# Patient Record
Sex: MALE | Race: WHITE | NOT HISPANIC OR LATINO | Employment: FULL TIME | URBAN - METROPOLITAN AREA
[De-identification: names, ages, dates, MRNs, and addresses within clinical notes are randomized per-mention and may not be internally consistent; named-entity substitution may affect disease eponyms.]

---

## 2023-01-25 RX ORDER — LORATADINE 10 MG/1
TABLET ORAL
COMMUNITY
Start: 2022-08-08

## 2023-03-08 ASSESSMENT — PROMIS GLOBAL HEALTH SCALE
RATE_QUALITY_OF_LIFE: VERY GOOD
RATE_MENTAL_HEALTH: VERY GOOD
RATE_PHYSICAL_HEALTH: VERY GOOD
RATE_GENERAL_HEALTH: VERY GOOD
RATE_AVERAGE_PAIN: 1
CARRYOUT_SOCIAL_ACTIVITIES: VERY GOOD
RATE_SOCIAL_SATISFACTION: VERY GOOD
EMOTIONAL_PROBLEMS: RARELY
CARRYOUT_PHYSICAL_ACTIVITIES: COMPLETELY
RATE_AVERAGE_FATIGUE: MILD

## 2023-03-09 ENCOUNTER — OFFICE VISIT (OUTPATIENT)
Dept: PRIMARY CARE | Facility: CLINIC | Age: 33
End: 2023-03-09
Payer: COMMERCIAL

## 2023-03-09 VITALS
TEMPERATURE: 97.6 F | HEIGHT: 67 IN | HEART RATE: 73 BPM | WEIGHT: 184.6 LBS | DIASTOLIC BLOOD PRESSURE: 60 MMHG | BODY MASS INDEX: 28.97 KG/M2 | OXYGEN SATURATION: 97 % | SYSTOLIC BLOOD PRESSURE: 120 MMHG

## 2023-03-09 DIAGNOSIS — Z00.00 HEALTH MAINTENANCE EXAMINATION: Primary | ICD-10-CM

## 2023-03-09 PROCEDURE — 1036F TOBACCO NON-USER: CPT | Performed by: STUDENT IN AN ORGANIZED HEALTH CARE EDUCATION/TRAINING PROGRAM

## 2023-03-09 PROCEDURE — 99395 PREV VISIT EST AGE 18-39: CPT | Performed by: STUDENT IN AN ORGANIZED HEALTH CARE EDUCATION/TRAINING PROGRAM

## 2023-03-09 ASSESSMENT — PATIENT HEALTH QUESTIONNAIRE - PHQ9
SUM OF ALL RESPONSES TO PHQ9 QUESTIONS 1 AND 2: 0
1. LITTLE INTEREST OR PLEASURE IN DOING THINGS: NOT AT ALL
2. FEELING DOWN, DEPRESSED OR HOPELESS: NOT AT ALL

## 2023-03-09 ASSESSMENT — ENCOUNTER SYMPTOMS
CONSTIPATION: 0
HEMATURIA: 0
CHILLS: 0
FATIGUE: 0
COUGH: 0
FREQUENCY: 0
PALPITATIONS: 0
DIARRHEA: 0
HEADACHES: 0
DYSPHORIC MOOD: 0
NAUSEA: 0
NERVOUS/ANXIOUS: 0
ABDOMINAL PAIN: 0
DYSURIA: 0
NUMBNESS: 0
FEVER: 0
SHORTNESS OF BREATH: 0
WHEEZING: 0
DIZZINESS: 0

## 2023-03-09 NOTE — PROGRESS NOTES
"Subjective   Patient ID: John Corona is a 32 y.o. male who presents for Annual Exam (CPE).    HPI     Adult Male physical     Patient Name: John Corona    Date: 23           Patient : 1990        Patient Age: 32 y.o.     CC:   Chief Complaint   Patient presents with    Annual Exam     CPE        SUBJECTIVE    Concerns: none      Screening:  Colon Cancer screening? N/a  Smoking hx? Not really      Immunizations: UTD    Social:  The patient is exercising regularly, 20+ mpw, not training for anything. Some core work.  in general, a \"healthy\" diet  , well balanced   Hx depression or anxiety: Depression last on meds   history of alcohol abuse none for the last 10 years   Had a substance abuse problem until 19 yo, has been clean for over 10 years. Was rx opiates      No past medical history on file.   Past Surgical History:   Procedure Laterality Date    OTHER SURGICAL HISTORY  2022    Hydrocele repair    OTHER SURGICAL HISTORY  2022    Appendectomy      Family History   Problem Relation Name Age of Onset    Asthma Mother      Hyperlipidemia Mother      Mental illness Mother      Hypertension Mother      Hyperlipidemia Father      Hypertension Father      Hyperlipidemia Maternal Grandmother      Alcohol abuse Maternal Grandfather      Hyperlipidemia Maternal Grandfather      Alcohol abuse Paternal Grandfather      Mental illness Other Sibling      Current Outpatient Medications   Medication Sig Dispense Refill    loratadine (Claritin) 10 mg tablet Take by mouth.       No current facility-administered medications for this visit.       No Known Allergies    Immunization History   Administered Date(s) Administered    Pfizer Purple Cap SARS-CoV-2 2021, 2021    Pfizer Sars-cov-2 Bivalent 30 mcg/0.3 mL 2022        Health Maintenance   Topic Date Due    Yearly Adult Physical  Never done    Lipid Panel  Never done    HIV Screening  Never done    Varicella Vaccines (1 of 2 - 2-dose " "childhood series) Never done    Hepatitis C Screening  Never done    DTaP/Tdap/Td Vaccines (2 - Td or Tdap) 02/23/2018    Zoster Vaccines (1 of 2) 07/08/2038    Hepatitis B Vaccines  Completed    MMR Vaccines  Completed    Influenza Vaccine  Completed    COVID-19 Vaccine  Completed    HIB Vaccines  Aged Out    IPV Vaccines  Aged Out    Hepatitis A Vaccines  Aged Out    Meningococcal Vaccine  Aged Out    Rotavirus Vaccines  Aged Out    HPV Vaccines  Aged Out    Pneumococcal Vaccine: Pediatrics (0 to 5 Years) and At-Risk Patients (6 to 64 Years)  Aged Out                Review of Systems   Constitutional:  Negative for chills, fatigue and fever.   Respiratory:  Negative for cough, shortness of breath and wheezing.    Cardiovascular:  Negative for chest pain, palpitations and leg swelling.   Gastrointestinal:  Negative for abdominal pain, constipation, diarrhea and nausea.   Genitourinary:  Negative for dysuria, frequency, hematuria and urgency.   Neurological:  Negative for dizziness, numbness and headaches.   Psychiatric/Behavioral:  Negative for dysphoric mood. The patient is not nervous/anxious.        Objective   /60 (BP Location: Left arm, Patient Position: Sitting, BP Cuff Size: Adult)   Pulse 73   Temp 36.4 °C (97.6 °F) (Temporal)   Ht 1.689 m (5' 6.5\")   Wt 83.7 kg (184 lb 9.6 oz)   SpO2 97%   BMI 29.35 kg/m²     Physical Exam  Constitutional:       General: He is not in acute distress.     Appearance: Normal appearance.   HENT:      Head: Normocephalic and atraumatic.      Right Ear: Tympanic membrane and ear canal normal.      Left Ear: Tympanic membrane and ear canal normal.      Mouth/Throat:      Mouth: Mucous membranes are moist.      Pharynx: No posterior oropharyngeal erythema.   Eyes:      Extraocular Movements: Extraocular movements intact.      Pupils: Pupils are equal, round, and reactive to light.   Cardiovascular:      Rate and Rhythm: Normal rate and regular rhythm.      Heart " sounds: No murmur heard.  Pulmonary:      Effort: Pulmonary effort is normal. No respiratory distress.      Breath sounds: Normal breath sounds. No wheezing.   Abdominal:      General: Bowel sounds are normal.      Palpations: Abdomen is soft.      Tenderness: There is no abdominal tenderness. There is no guarding.   Musculoskeletal:      Cervical back: Neck supple.      Comments: Normal gait and range of motion   Skin:     General: Skin is warm and dry.   Neurological:      General: No focal deficit present.      Mental Status: He is alert and oriented to person, place, and time.   Psychiatric:         Mood and Affect: Mood normal.         Behavior: Behavior normal.         Assessment/Plan   Problem List Items Addressed This Visit    None  Visit Diagnoses       Health maintenance examination    -  Primary    Relevant Orders    Lipid Panel    Comprehensive Metabolic Panel    CBC    Follow Up In Primary Care          We will obtain fasting blood work.  Results will be communicated to the patient via MyChart or a letter.   We reviewed appropriate preventative health screening guidelines. The patient is up-to-date on Immunizations.  We discussed regular aerobic exercise. We discussed proper nutrition and weight control.     Orders Placed This Encounter   Procedures    Lipid Panel     Standing Status:   Future     Standing Expiration Date:   3/9/2024     Order Specific Question:   Release to patient     Answer:   Immediate    Comprehensive Metabolic Panel     Standing Status:   Future     Standing Expiration Date:   3/9/2024     Order Specific Question:   Release to patient     Answer:   Immediate    CBC     Standing Status:   Future     Standing Expiration Date:   3/9/2024     Order Specific Question:   Release to patient     Answer:   Immediate        No follow-ups on file.       Patient understands and agrees with treatment plan    Ivone Zee DO   03/09/23

## 2023-03-09 NOTE — PATIENT INSTRUCTIONS
Recommendations for men annual wellness exam:   Colonoscopy at age 45 or earlier if positive family history of colon cancer   Discuss prostate cancer screening between ages 55-69 or sooner if family history of prostate cancer   One time screen for abdominal aortic aneurysm for men ages 65-75 who have ever smoked  Screening for lung cancer with low-dose CT in all adults age 55-77 who have a 30 pack-year smoking history and currently smoke or have quit within the past 15 years  Follow a healthy diet (Dash diet, Mediterranean diet)  Exercise 150 min/wk  Maintain healthy weight (BMI < 25)  Do not smoke   Alcohol in moderation (up to 2 drinks/day)   Get enough sleep (7-8 hours/night)  Make sure immunizations are up to date (influenza, pneumococcal, Tdap, shingles if age > 50)  Visit dentist twice yearly

## 2023-03-23 ENCOUNTER — LAB (OUTPATIENT)
Dept: LAB | Facility: LAB | Age: 33
End: 2023-03-23
Payer: COMMERCIAL

## 2023-03-23 DIAGNOSIS — Z00.00 HEALTH MAINTENANCE EXAMINATION: ICD-10-CM

## 2023-03-23 LAB
ALANINE AMINOTRANSFERASE (SGPT) (U/L) IN SER/PLAS: 18 U/L (ref 10–52)
ALBUMIN (G/DL) IN SER/PLAS: 4.8 G/DL (ref 3.4–5)
ALKALINE PHOSPHATASE (U/L) IN SER/PLAS: 58 U/L (ref 33–120)
ANION GAP IN SER/PLAS: 12 MMOL/L (ref 10–20)
ASPARTATE AMINOTRANSFERASE (SGOT) (U/L) IN SER/PLAS: 17 U/L (ref 9–39)
BILIRUBIN TOTAL (MG/DL) IN SER/PLAS: 1.1 MG/DL (ref 0–1.2)
CALCIUM (MG/DL) IN SER/PLAS: 9.6 MG/DL (ref 8.6–10.3)
CARBON DIOXIDE, TOTAL (MMOL/L) IN SER/PLAS: 29 MMOL/L (ref 21–32)
CHLORIDE (MMOL/L) IN SER/PLAS: 101 MMOL/L (ref 98–107)
CHOLESTEROL (MG/DL) IN SER/PLAS: 226 MG/DL (ref 0–199)
CHOLESTEROL IN HDL (MG/DL) IN SER/PLAS: 60.2 MG/DL
CHOLESTEROL/HDL RATIO: 3.8
CREATININE (MG/DL) IN SER/PLAS: 1.01 MG/DL (ref 0.5–1.3)
ERYTHROCYTE DISTRIBUTION WIDTH (RATIO) BY AUTOMATED COUNT: 13.3 % (ref 11.5–14.5)
ERYTHROCYTE MEAN CORPUSCULAR HEMOGLOBIN CONCENTRATION (G/DL) BY AUTOMATED: 31.4 G/DL (ref 32–36)
ERYTHROCYTE MEAN CORPUSCULAR VOLUME (FL) BY AUTOMATED COUNT: 83 FL (ref 80–100)
ERYTHROCYTES (10*6/UL) IN BLOOD BY AUTOMATED COUNT: 5.92 X10E12/L (ref 4.5–5.9)
GFR MALE: >90 ML/MIN/1.73M2
GLUCOSE (MG/DL) IN SER/PLAS: 89 MG/DL (ref 74–99)
HEMATOCRIT (%) IN BLOOD BY AUTOMATED COUNT: 49 % (ref 41–52)
HEMOGLOBIN (G/DL) IN BLOOD: 15.4 G/DL (ref 13.5–17.5)
LDL: 152 MG/DL (ref 0–99)
LEUKOCYTES (10*3/UL) IN BLOOD BY AUTOMATED COUNT: 6.1 X10E9/L (ref 4.4–11.3)
PLATELETS (10*3/UL) IN BLOOD AUTOMATED COUNT: 321 X10E9/L (ref 150–450)
POTASSIUM (MMOL/L) IN SER/PLAS: 4.4 MMOL/L (ref 3.5–5.3)
PROTEIN TOTAL: 7.1 G/DL (ref 6.4–8.2)
SODIUM (MMOL/L) IN SER/PLAS: 138 MMOL/L (ref 136–145)
TRIGLYCERIDE (MG/DL) IN SER/PLAS: 68 MG/DL (ref 0–149)
UREA NITROGEN (MG/DL) IN SER/PLAS: 19 MG/DL (ref 6–23)
VLDL: 14 MG/DL (ref 0–40)

## 2023-03-23 PROCEDURE — 36415 COLL VENOUS BLD VENIPUNCTURE: CPT

## 2023-03-23 PROCEDURE — 80061 LIPID PANEL: CPT

## 2023-03-23 PROCEDURE — 80053 COMPREHEN METABOLIC PANEL: CPT

## 2023-03-23 PROCEDURE — 85027 COMPLETE CBC AUTOMATED: CPT

## 2024-06-25 ASSESSMENT — PROMIS GLOBAL HEALTH SCALE
RATE_AVERAGE_PAIN: 0
CARRYOUT_SOCIAL_ACTIVITIES: GOOD
RATE_SOCIAL_SATISFACTION: VERY GOOD
RATE_PHYSICAL_HEALTH: VERY GOOD
EMOTIONAL_PROBLEMS: ALWAYS
RATE_MENTAL_HEALTH: FAIR
RATE_GENERAL_HEALTH: GOOD
RATE_AVERAGE_FATIGUE: MODERATE
RATE_QUALITY_OF_LIFE: GOOD
CARRYOUT_PHYSICAL_ACTIVITIES: COMPLETELY

## 2024-06-27 ENCOUNTER — APPOINTMENT (OUTPATIENT)
Dept: PRIMARY CARE | Facility: CLINIC | Age: 34
End: 2024-06-27
Payer: COMMERCIAL

## 2024-09-29 ASSESSMENT — PROMIS GLOBAL HEALTH SCALE
CARRYOUT_SOCIAL_ACTIVITIES: GOOD
RATE_AVERAGE_FATIGUE: MODERATE
RATE_SOCIAL_SATISFACTION: VERY GOOD
CARRYOUT_PHYSICAL_ACTIVITIES: COMPLETELY
RATE_AVERAGE_PAIN: 0
RATE_QUALITY_OF_LIFE: GOOD
EMOTIONAL_PROBLEMS: OFTEN
RATE_GENERAL_HEALTH: GOOD
RATE_PHYSICAL_HEALTH: VERY GOOD
RATE_MENTAL_HEALTH: FAIR

## 2024-10-01 ENCOUNTER — APPOINTMENT (OUTPATIENT)
Dept: PRIMARY CARE | Facility: CLINIC | Age: 34
End: 2024-10-01
Payer: COMMERCIAL

## 2024-10-01 ENCOUNTER — LAB (OUTPATIENT)
Dept: LAB | Facility: LAB | Age: 34
End: 2024-10-01
Payer: COMMERCIAL

## 2024-10-01 VITALS
OXYGEN SATURATION: 97 % | SYSTOLIC BLOOD PRESSURE: 124 MMHG | DIASTOLIC BLOOD PRESSURE: 84 MMHG | WEIGHT: 198.6 LBS | BODY MASS INDEX: 31.17 KG/M2 | TEMPERATURE: 98.4 F | HEIGHT: 67 IN | HEART RATE: 70 BPM

## 2024-10-01 DIAGNOSIS — E55.9 VITAMIN D DEFICIENCY: ICD-10-CM

## 2024-10-01 DIAGNOSIS — Z23 NEED FOR IMMUNIZATION AGAINST INFLUENZA: ICD-10-CM

## 2024-10-01 DIAGNOSIS — Z00.00 HEALTH MAINTENANCE EXAMINATION: ICD-10-CM

## 2024-10-01 DIAGNOSIS — Z00.00 HEALTH MAINTENANCE EXAMINATION: Primary | ICD-10-CM

## 2024-10-01 LAB
25(OH)D3 SERPL-MCNC: 52 NG/ML (ref 30–100)
ANION GAP SERPL CALC-SCNC: 12 MMOL/L (ref 10–20)
BUN SERPL-MCNC: 17 MG/DL (ref 6–23)
CALCIUM SERPL-MCNC: 9.2 MG/DL (ref 8.6–10.3)
CHLORIDE SERPL-SCNC: 104 MMOL/L (ref 98–107)
CHOLEST SERPL-MCNC: 241 MG/DL (ref 0–199)
CHOLESTEROL/HDL RATIO: 3.5
CO2 SERPL-SCNC: 28 MMOL/L (ref 21–32)
CREAT SERPL-MCNC: 0.98 MG/DL (ref 0.5–1.3)
EGFRCR SERPLBLD CKD-EPI 2021: >90 ML/MIN/1.73M*2
ERYTHROCYTE [DISTWIDTH] IN BLOOD BY AUTOMATED COUNT: 13.8 % (ref 11.5–14.5)
GLUCOSE SERPL-MCNC: 84 MG/DL (ref 74–99)
HCT VFR BLD AUTO: 49.8 % (ref 41–52)
HDLC SERPL-MCNC: 68.2 MG/DL
HGB BLD-MCNC: 16 G/DL (ref 13.5–17.5)
LDLC SERPL CALC-MCNC: 160 MG/DL
MCH RBC QN AUTO: 26.5 PG (ref 26–34)
MCHC RBC AUTO-ENTMCNC: 32.1 G/DL (ref 32–36)
MCV RBC AUTO: 83 FL (ref 80–100)
NON HDL CHOLESTEROL: 173 MG/DL (ref 0–149)
NRBC BLD-RTO: 0 /100 WBCS (ref 0–0)
PLATELET # BLD AUTO: 299 X10*3/UL (ref 150–450)
POTASSIUM SERPL-SCNC: 4.7 MMOL/L (ref 3.5–5.3)
RBC # BLD AUTO: 6.04 X10*6/UL (ref 4.5–5.9)
SODIUM SERPL-SCNC: 139 MMOL/L (ref 136–145)
TRIGL SERPL-MCNC: 65 MG/DL (ref 0–149)
VLDL: 13 MG/DL (ref 0–40)
WBC # BLD AUTO: 6.7 X10*3/UL (ref 4.4–11.3)

## 2024-10-01 PROCEDURE — 99395 PREV VISIT EST AGE 18-39: CPT | Performed by: STUDENT IN AN ORGANIZED HEALTH CARE EDUCATION/TRAINING PROGRAM

## 2024-10-01 PROCEDURE — 36415 COLL VENOUS BLD VENIPUNCTURE: CPT

## 2024-10-01 PROCEDURE — 90471 IMMUNIZATION ADMIN: CPT | Performed by: STUDENT IN AN ORGANIZED HEALTH CARE EDUCATION/TRAINING PROGRAM

## 2024-10-01 PROCEDURE — 3008F BODY MASS INDEX DOCD: CPT | Performed by: STUDENT IN AN ORGANIZED HEALTH CARE EDUCATION/TRAINING PROGRAM

## 2024-10-01 PROCEDURE — 85027 COMPLETE CBC AUTOMATED: CPT

## 2024-10-01 PROCEDURE — 80061 LIPID PANEL: CPT

## 2024-10-01 PROCEDURE — 82306 VITAMIN D 25 HYDROXY: CPT

## 2024-10-01 PROCEDURE — 90656 IIV3 VACC NO PRSV 0.5 ML IM: CPT | Performed by: STUDENT IN AN ORGANIZED HEALTH CARE EDUCATION/TRAINING PROGRAM

## 2024-10-01 PROCEDURE — 80048 BASIC METABOLIC PNL TOTAL CA: CPT

## 2024-10-01 RX ORDER — CETIRIZINE HYDROCHLORIDE 10 MG/1
0.5 TABLET ORAL DAILY
COMMUNITY

## 2024-10-01 ASSESSMENT — ENCOUNTER SYMPTOMS
OCCASIONAL FEELINGS OF UNSTEADINESS: 0
DEPRESSION: 0

## 2024-10-01 ASSESSMENT — PATIENT HEALTH QUESTIONNAIRE - PHQ9
3. TROUBLE FALLING OR STAYING ASLEEP OR SLEEPING TOO MUCH: SEVERAL DAYS
2. FEELING DOWN, DEPRESSED OR HOPELESS: SEVERAL DAYS
1. LITTLE INTEREST OR PLEASURE IN DOING THINGS: NOT AT ALL
4. FEELING TIRED OR HAVING LITTLE ENERGY: SEVERAL DAYS
SUM OF ALL RESPONSES TO PHQ9 QUESTIONS 1 AND 2: 1

## 2024-10-01 NOTE — PROGRESS NOTES
"John Corona  presents for his annual wellness exam.    Specialists seen by patient: eye doctor  -dentist    Hx of colon ca screening: N/A  Immunizations: flu today, tdap UTD, will think about covid  Diet: Follows a healthy diet, well rounded  Exercise: Gets regular exercise, running 22.5 miles per week   History of anxiety or depression: Situational  Alcohol abuse screen:   Number of alcoholic drinks per week:  0, sober for 12 years   How many times in the past year 5 or more drinks in a day? 0  Lung cancer screening: n/a  Smoking history: never a smoker  AAA US: not applicable  Drug use: No  Depression screen yearly (phq-2): 1  Living will/healthcare power of : Yes - reviewed    Gets several sinus infections per year. Does a flonase, sinus rinse and zyrtec. For the past 3-4 years it has been happening. He feels that he breaths more out of one nostril.     Review of Systems   Constitutional:  Negative for chills, fatigue and fever.   Respiratory:  Negative for cough, shortness of breath and wheezing.    Cardiovascular:  Negative for chest pain, palpitations and leg swelling.   Gastrointestinal:  Negative for abdominal pain, constipation, diarrhea and nausea.   Genitourinary:  Negative for dysuria, frequency, hematuria and urgency.   Neurological:  Negative for dizziness, numbness and headaches.   Psychiatric/Behavioral:  Negative for dysphoric mood. The patient is not nervous/anxious.           Objective    /84 (BP Location: Left arm, Patient Position: Sitting, BP Cuff Size: Large adult)   Pulse 70   Temp 36.9 °C (98.4 °F) (Temporal)   Ht 1.695 m (5' 6.73\")   Wt 90.1 kg (198 lb 9.6 oz)   SpO2 97%   BMI 31.36 kg/m²     Physical Exam  Constitutional:       General: He is not in acute distress.     Appearance: Normal appearance.   HENT:      Head: Normocephalic and atraumatic.      Right Ear: Tympanic membrane and ear canal normal.      Left Ear: Tympanic membrane and ear canal normal.      " Mouth/Throat:      Mouth: Mucous membranes are moist.      Pharynx: No posterior oropharyngeal erythema.   Eyes:      Extraocular Movements: Extraocular movements intact.      Pupils: Pupils are equal, round, and reactive to light.   Cardiovascular:      Rate and Rhythm: Normal rate and regular rhythm.      Heart sounds: No murmur heard.  Pulmonary:      Effort: Pulmonary effort is normal. No respiratory distress.      Breath sounds: Normal breath sounds. No wheezing.   Abdominal:      General: Bowel sounds are normal.      Palpations: Abdomen is soft.      Tenderness: There is no abdominal tenderness. There is no guarding.   Musculoskeletal:         General: Normal range of motion.      Cervical back: Neck supple.   Skin:     General: Skin is warm and dry.   Neurological:      General: No focal deficit present.      Mental Status: He is alert and oriented to person, place, and time.   Psychiatric:         Mood and Affect: Mood normal.         Behavior: Behavior normal.            Problem List Items Addressed This Visit    None  Visit Diagnoses       Health maintenance examination    -  Primary    Relevant Orders    Lipid Panel (Completed)    Basic Metabolic Panel (Completed)    CBC (Completed)    Need for immunization against influenza        Relevant Orders    Flu vaccine, trivalent, preservative free, age 6 months and greater (Fluraix/Fluzone/Flulaval) (Completed)    Vitamin D deficiency        Relevant Orders    Vitamin D 25 hydroxy (Completed)             We will obtain fasting blood work.  Results will be communicated to the patient via MyChart, phone call, or a letter.   We reviewed appropriate preventative health screening guidelines.  We discussed regular aerobic exercise. We discussed proper nutrition and weight control.    He will let me know if he wants to follow-up on the sinus issues and seeing ENT.    5-10 minutes were spent screening for depression using PHQ-2/PHQ-9 as documented in the chart      Ivone Zee, DO  10/07/24

## 2024-10-07 ASSESSMENT — ENCOUNTER SYMPTOMS
HEMATURIA: 0
ABDOMINAL PAIN: 0
DYSPHORIC MOOD: 0
FREQUENCY: 0
WHEEZING: 0
HEADACHES: 0
FATIGUE: 0
FEVER: 0
COUGH: 0
SHORTNESS OF BREATH: 0
NERVOUS/ANXIOUS: 0
NAUSEA: 0
DYSURIA: 0
CONSTIPATION: 0
NUMBNESS: 0
DIARRHEA: 0
PALPITATIONS: 0
CHILLS: 0
DIZZINESS: 0

## 2025-03-28 ENCOUNTER — OFFICE VISIT (OUTPATIENT)
Dept: PRIMARY CARE | Facility: CLINIC | Age: 35
End: 2025-03-28
Payer: COMMERCIAL

## 2025-03-28 VITALS
HEART RATE: 57 BPM | SYSTOLIC BLOOD PRESSURE: 131 MMHG | WEIGHT: 180.4 LBS | BODY MASS INDEX: 28.48 KG/M2 | DIASTOLIC BLOOD PRESSURE: 73 MMHG | OXYGEN SATURATION: 98 % | TEMPERATURE: 97.4 F

## 2025-03-28 DIAGNOSIS — N50.812 PAIN IN LEFT TESTICLE: Primary | ICD-10-CM

## 2025-03-28 PROCEDURE — 99213 OFFICE O/P EST LOW 20 MIN: CPT | Performed by: STUDENT IN AN ORGANIZED HEALTH CARE EDUCATION/TRAINING PROGRAM

## 2025-03-28 RX ORDER — SAME BUTANEDISULFONATE/BETAINE 400-600 MG
POWDER IN PACKET (EA) ORAL
COMMUNITY

## 2025-03-28 RX ORDER — BISMUTH SUBSALICYLATE 262 MG
1 TABLET,CHEWABLE ORAL DAILY
COMMUNITY

## 2025-03-28 RX ORDER — SULFAMETHOXAZOLE AND TRIMETHOPRIM 800; 160 MG/1; MG/1
1 TABLET ORAL 2 TIMES DAILY
Qty: 20 TABLET | Refills: 0 | Status: SHIPPED | OUTPATIENT
Start: 2025-03-28 | End: 2025-04-07

## 2025-03-28 RX ORDER — GLUCOSAM/CHONDRO/HERB 149/HYAL 750-100 MG
TABLET ORAL
COMMUNITY

## 2025-03-28 ASSESSMENT — ENCOUNTER SYMPTOMS
CONSTIPATION: 0
SHORTNESS OF BREATH: 0
NAUSEA: 0
COUGH: 0
DIARRHEA: 0
BLOOD IN STOOL: 0
FEVER: 0
VOMITING: 0
DYSURIA: 0
HEMATURIA: 0
CHILLS: 0
ABDOMINAL PAIN: 0

## 2025-03-28 ASSESSMENT — PATIENT HEALTH QUESTIONNAIRE - PHQ9
2. FEELING DOWN, DEPRESSED OR HOPELESS: NOT AT ALL
1. LITTLE INTEREST OR PLEASURE IN DOING THINGS: NOT AT ALL
SUM OF ALL RESPONSES TO PHQ9 QUESTIONS 1 & 2: 0

## 2025-03-28 NOTE — PROGRESS NOTES
Assessment/Plan   Assessment & Plan  Pain in left testicle    Orders:    US scrotum; Future    Referral to Urology; Future    sulfamethoxazole-trimethoprim (Bactrim DS) 800-160 mg tablet; Take 1 tablet by mouth 2 times a day for 10 days.        Subjective   Patient ID: John Corona is a 34 y.o. male who presents for Testicle Pain (Pain and swelling in left testicle).  Testicle Pain  The patient's primary symptoms include scrotal swelling and testicular pain. Pertinent negatives include no abdominal pain, chest pain, chills, constipation, coughing, diarrhea, dysuria, fever, nausea, shortness of breath or vomiting.         He has had pain in L testicle that has been worsening and is associated with swelling.     Pain is rated 0/10 at this moment but it can become 7/10 when walking and sometimes will be 5/10 with just sitting.     Of note he had L hydrocele diagnosed at 16-17 years old that was surgically operated on.     Denies new sexual partners, has never had STD.  Denies issues with urination including dysuria, hematuria.   Review of Systems   Constitutional:  Negative for chills and fever.   Respiratory:  Negative for cough and shortness of breath.    Cardiovascular:  Negative for chest pain.   Gastrointestinal:  Negative for abdominal pain, blood in stool, constipation, diarrhea, nausea and vomiting.   Genitourinary:  Positive for scrotal swelling and testicular pain. Negative for dysuria and hematuria.       Objective   Physical Exam  Constitutional:       Appearance: Normal appearance.   HENT:      Head: Normocephalic and atraumatic.   Eyes:      Extraocular Movements: Extraocular movements intact.   Genitourinary:     Testes:         Right: Mass, tenderness or swelling not present.         Left: Tenderness and swelling present. Mass not present.   Neurological:      Mental Status: He is alert.               Leon Suárez MD 03/28/25 3:36 PM

## 2025-03-29 ENCOUNTER — HOSPITAL ENCOUNTER (OUTPATIENT)
Dept: RADIOLOGY | Facility: CLINIC | Age: 35
Discharge: HOME | End: 2025-03-29
Payer: COMMERCIAL

## 2025-03-29 DIAGNOSIS — N50.812 PAIN IN LEFT TESTICLE: ICD-10-CM

## 2025-03-29 PROCEDURE — 76870 US EXAM SCROTUM: CPT

## 2025-03-29 PROCEDURE — 76870 US EXAM SCROTUM: CPT | Performed by: STUDENT IN AN ORGANIZED HEALTH CARE EDUCATION/TRAINING PROGRAM

## 2025-03-31 ENCOUNTER — APPOINTMENT (OUTPATIENT)
Dept: PRIMARY CARE | Facility: CLINIC | Age: 35
End: 2025-03-31
Payer: COMMERCIAL

## 2025-04-02 ENCOUNTER — APPOINTMENT (OUTPATIENT)
Dept: UROLOGY | Facility: CLINIC | Age: 35
End: 2025-04-02
Payer: COMMERCIAL

## 2025-04-02 VITALS — SYSTOLIC BLOOD PRESSURE: 147 MMHG | DIASTOLIC BLOOD PRESSURE: 98 MMHG | HEART RATE: 88 BPM

## 2025-04-02 DIAGNOSIS — N50.812 PAIN IN LEFT TESTICLE: ICD-10-CM

## 2025-04-02 DIAGNOSIS — N43.40 SPERMATOCELE: Primary | ICD-10-CM

## 2025-04-02 PROBLEM — E66.9 OBESITY WITH BODY MASS INDEX 30 OR GREATER: Status: ACTIVE | Noted: 2025-04-02

## 2025-04-02 PROBLEM — R09.89 CHRONIC SINUS COMPLAINTS: Status: ACTIVE | Noted: 2022-09-29

## 2025-04-02 PROBLEM — N43.3 HYDROCELE IN ADULT: Status: ACTIVE | Noted: 2025-01-16

## 2025-04-02 LAB
POC APPEARANCE, URINE: CLEAR
POC BILIRUBIN, URINE: NEGATIVE
POC BLOOD, URINE: ABNORMAL
POC COLOR, URINE: YELLOW
POC GLUCOSE, URINE: NEGATIVE MG/DL
POC KETONES, URINE: NEGATIVE MG/DL
POC LEUKOCYTES, URINE: NEGATIVE
POC NITRITE,URINE: NEGATIVE
POC PH, URINE: 6 PH
POC PROTEIN, URINE: NEGATIVE MG/DL
POC SPECIFIC GRAVITY, URINE: 1.02
POC UROBILINOGEN, URINE: 0.2 EU/DL

## 2025-04-02 PROCEDURE — 99204 OFFICE O/P NEW MOD 45 MIN: CPT | Performed by: NURSE PRACTITIONER

## 2025-04-02 PROCEDURE — 81003 URINALYSIS AUTO W/O SCOPE: CPT | Performed by: NURSE PRACTITIONER

## 2025-04-02 RX ORDER — IBUPROFEN 800 MG/1
800 TABLET ORAL 3 TIMES DAILY
Start: 2025-04-02 | End: 2025-04-06

## 2025-04-02 NOTE — PATIENT INSTRUCTIONS
Ibuprofen 800 mg three times a day  Supportive underwear or jock strap  Try ice or heat (or both); use for no more than 10 minutes at time

## 2025-04-02 NOTE — PROGRESS NOTES
UROLOGIC INITIAL EVALUATION     PROBLEM LIST:  1. Spermatocele  ibuprofen 800 mg tablet    Urine Culture    Microscopic Only, Urine      2. Pain in left testicle  Referral to Urology    POCT UA Automated manually resulted    ibuprofen 800 mg tablet    Urine Culture           HISTORY OF PRESENT ILLNESS:   John Corona is a 34 y.o. with no significant PMH  Kindly referred from Primary Care for evaluation and management of testicular pain  Seen unaccompanied   Reports brief discomfort mid-January, attributed to running  Pain returned abruptly, seen by PCP  Started on antibiotic with improvement in pain  Discomfort continues  Standing, sitting, walking for too long makes it worse  Lying down helps, not much benefit with occasional ibuprofen  Two-three episodes of anejaculation in past several months  L hydrocele repair at age 14  No urinary issues    Anxious, perspiring heavily during visit  Improved after lying down on exam table    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    PAST SURGICAL HISTORY:  Past Surgical History:   Procedure Laterality Date    OTHER SURGICAL HISTORY  08/08/2022    Hydrocele repair    OTHER SURGICAL HISTORY  08/08/2022    Appendectomy        ALLERGIES:   No Known Allergies     MEDICATIONS:   Current Outpatient Medications on File Prior to Visit   Medication Sig Dispense Refill    cetirizine (ZyrTEC) 10 mg tablet Take 0.5 tablets (5 mg) by mouth once daily.      cholecalciferol, vitD3,/vit K2 (vitamin D3-vitamin K2) 125-90 mcg capsule Take by mouth.      multivitamin tablet Take 1 tablet by mouth once daily.      omega 3-dha-epa-fish oil (Fish OiL) 1,000 (120-180) mg capsule Take by mouth.      sulfamethoxazole-trimethoprim (Bactrim DS) 800-160 mg tablet Take 1 tablet by mouth 2 times a day for 10 days. 20 tablet 0     No current facility-administered medications on file prior to visit.        SOCIAL HISTORY:  Patient  reports that he has  never smoked. He has never used smokeless tobacco. He reports that he does not currently use alcohol. He reports that he does not currently use drugs.   Social History     Socioeconomic History    Marital status:      Spouse name: Not on file    Number of children: Not on file    Years of education: Not on file    Highest education level: Not on file   Occupational History    Not on file   Tobacco Use    Smoking status: Never    Smokeless tobacco: Never   Vaping Use    Vaping status: Never Used   Substance and Sexual Activity    Alcohol use: Not Currently    Drug use: Not Currently    Sexual activity: Yes   Other Topics Concern    Not on file   Social History Narrative    Not on file     Social Drivers of Health     Financial Resource Strain: Not on file   Food Insecurity: Not on file   Transportation Needs: Not on file   Physical Activity: Not on file   Stress: Not on file   Social Connections: Not on file   Intimate Partner Violence: Not on file   Housing Stability: Not on file       FAMILY HISTORY:  Family History   Problem Relation Name Age of Onset    Asthma Mother Sara     Hyperlipidemia Mother Sara     Mental illness Mother Sara     Hypertension Mother Sara     Hyperlipidemia Father Sara     Hypertension Father Sara     Hyperlipidemia Maternal Grandmother Allyn     Alcohol abuse Maternal Grandfather Michael     Hyperlipidemia Maternal Grandfather Michael     Alcohol abuse Paternal Grandfather Connor     Mental illness Other Sibling        REVIEW OF SYSTEMS:  All systems reviewed, pertinent negatives as noted in HPI.     PHYSICAL EXAM:  Visit Vitals  BP (!) 147/98   Pulse 88     Constitutional: Well-developed and well-nourished. No distress.    Head: Normocephalic and atraumatic.    Neck: Normal range of motion.     Pulmonary/Chest: Effort normal. No respiratory distress.   Abdominal: Non-distended.  : See below.   Integumentary: No rash or lesions visualized.  Musculoskeletal: Normal range of  "motion.    Neurological: Alert, grossly intact.  Psychiatric: Normal mood and affect. Thought content normal.      LABORATORY REVIEW:   Lab Results   Component Value Date    BUN 17 10/01/2024    CREATININE 0.98 10/01/2024    EGFR >90 10/01/2024     10/01/2024    K 4.7 10/01/2024     10/01/2024    CO2 28 10/01/2024    CALCIUM 9.2 10/01/2024      Lab Results   Component Value Date    WBC 6.7 10/01/2024    RBC 6.04 (H) 10/01/2024    HGB 16.0 10/01/2024    HCT 49.8 10/01/2024    MCV 83 10/01/2024    MCH 26.5 10/01/2024    MCHC 32.1 10/01/2024    RDW 13.8 10/01/2024     10/01/2024        No results found for: \"PSA\"          Assessment:      1. Spermatocele  ibuprofen 800 mg tablet    Urine Culture    Microscopic Only, Urine      2. Pain in left testicle  Referral to Urology    POCT UA Automated manually resulted    ibuprofen 800 mg tablet    Urine Culture          John Corona is a 34 y.o. with testicular pain & swelling, hx L hydrocele repair, episodes of anejaculation   Scrotal US 3/29/25 showed large septated L hemiscrotum cystic lesion measuring 8.9 x 2.2 x 4.2 cm  Pain improved but not resolved with Bactrim  UA today + leuks    R testicle without mass or lesion  Firm, tender mass above L testicle  Prominent spermatic cord     Suspect spermatocele; discussed anticipated surgical management  Agreeable to plan as below     Plan:   Urine for micro, culture; will treat as indicated  Refer to Dr. Fleming for further assessment, management  Conservative management in the interim; scheduled ibuprofen 800 mg po TID, supportive underwear, heat/ice  Encouraged to contact us with any questions, concerns       "

## 2025-04-03 LAB
BACTERIA #/AREA URNS HPF: NORMAL /HPF
HYALINE CASTS #/AREA URNS LPF: NORMAL /LPF
RBC #/AREA URNS HPF: NORMAL /HPF
SERVICE CMNT-IMP: NORMAL
SQUAMOUS #/AREA URNS HPF: NORMAL /HPF
WBC #/AREA URNS HPF: NORMAL /HPF

## 2025-04-04 LAB — BACTERIA UR CULT: NORMAL

## 2025-05-05 ENCOUNTER — OFFICE VISIT (OUTPATIENT)
Dept: UROLOGY | Facility: HOSPITAL | Age: 35
End: 2025-05-05
Payer: COMMERCIAL

## 2025-05-05 DIAGNOSIS — N43.3 HYDROCELE IN ADULT: ICD-10-CM

## 2025-05-05 DIAGNOSIS — N43.40 SPERMATOCELE: ICD-10-CM

## 2025-05-05 PROCEDURE — 99214 OFFICE O/P EST MOD 30 MIN: CPT | Performed by: UROLOGY

## 2025-05-05 NOTE — PROGRESS NOTES
HPI:  34 y.o. yo male patient complains of scrotal pain    #Scrotal pain   which side/ or both- left  where is pain located- left scrotal pain  any noticeable swelling- yes, left   History of UTI/ STD exposure- no  History of vasectomy-  no    L hydrocele diagnosed at 16-17 years old that was surgically operated on.       CBC, [facility] 10  === 03/29/25 ===    US SCROTUM, PERSONALLY REVIEWED/INTERPRETED:     - Impression -  1. No acute scrotal pathology.  2. Large septated left hemiscrotum cystic lesion measuring 8.9 x 2.2  x 4.2 cm. Diagnostic considerations include complex epididymal cyst ,  spermatocele or chronic hydrocele.    CBC, 10/1/24:  Component      Latest Ref Rng 10/1/2024   WBC      4.4 - 11.3 x10*3/uL 6.7    RBC      4.50 - 5.90 x10*6/uL 6.04 (H)    HEMOGLOBIN      13.5 - 17.5 g/dL 16.0    HEMATOCRIT      41.0 - 52.0 % 49.8    MCV      80 - 100 fL 83    MCHC      32.0 - 36.0 g/dL 32.1    Platelets      150 - 450 x10*3/uL 299    RED CELL DISTRIBUTION WIDTH      11.5 - 14.5 % 13.8    nRBC      0.0 - 0.0 /100 WBCs 0.0    MCH      26.0 - 34.0 pg 26.5       BMP, 10/1/24:  Component      Latest Ref Rng 10/1/2024   GLUCOSE      74 - 99 mg/dL 84    SODIUM      136 - 145 mmol/L 139    POTASSIUM      3.5 - 5.3 mmol/L 4.7    CHLORIDE      98 - 107 mmol/L 104    Bicarbonate      21 - 32 mmol/L 28    Anion Gap      10 - 20 mmol/L 12    Blood Urea Nitrogen      6 - 23 mg/dL 17    Creatinine      0.50 - 1.30 mg/dL 0.98    Calcium      8.6 - 10.3 mg/dL 9.2    EGFR      >60 mL/min/1.73m*2 >90          PMH:  Medical History[1]     PSH:  Surgical History[2]     Medications:  Current Medications[3]    Allergy:  Allergies[4]     Exam  Testicles descended bilaterally, nontender, no masses  Vasa palpable bilaterally  Varicocele: No  Penis circ'd, no lesions, no plaques  Left sided scrotal swelling present     Assessment/Plan  #scrotal pain - left spermatoclee  Discussed scrotal pain and its different etiologies, including  epididymitis, cancer, orchitis, etc.  Discussed workup for epididymo-orchitis could include US, urine culture, and GC/CT  Discussed that even if he has a varicocele, there is no guarantee that this pain is from varicocele and that varicocele repair will alleviate the pain  Discussed cord block and microsurgical cord denervation in detail, including risks benefits and alternatives  Discussed conservative measures like nsaids, elevation, ice, tight underwear etc.  Also discussed pelvic floor PT    Patient elects   Pelvic floor PT    #LEFT SPERMATOCELE  -Discussed options for spermatocelectomy, specifically, observation, aspiration and spermatocelectomy  - Discussed risks, including bleeding, infection, damage to adjacent structures (specifically testicle and epididymis / vas) , testicular atrophy, need for further procedures, recurrence,  etc. Discussed that this is an outpatient procedure that may require a temporary drain placement. All questions answered.   - not bothersome enough for treatment at this time   -will do pelvic floor PT    Warning signs reviewed  Pt will contact us to follow up         [1] No past medical history on file.  [2]   Past Surgical History:  Procedure Laterality Date    OTHER SURGICAL HISTORY  08/08/2022    Hydrocele repair    OTHER SURGICAL HISTORY  08/08/2022    Appendectomy   [3]   Current Outpatient Medications:     cetirizine (ZyrTEC) 10 mg tablet, Take 0.5 tablets (5 mg) by mouth once daily., Disp: , Rfl:     cholecalciferol, vitD3,/vit K2 (vitamin D3-vitamin K2) 125-90 mcg capsule, Take by mouth., Disp: , Rfl:     multivitamin tablet, Take 1 tablet by mouth once daily., Disp: , Rfl:     omega 3-dha-epa-fish oil (Fish OiL) 1,000 (120-180) mg capsule, Take by mouth., Disp: , Rfl:   [4] No Known Allergies

## 2025-05-08 ENCOUNTER — PREP FOR PROCEDURE (OUTPATIENT)
Dept: UROLOGY | Facility: CLINIC | Age: 35
End: 2025-05-08
Payer: COMMERCIAL

## 2025-05-08 DIAGNOSIS — N43.40 SPERMATOCELE: Primary | ICD-10-CM

## 2025-05-08 RX ORDER — ACETAMINOPHEN 325 MG/1
975 TABLET ORAL ONCE
OUTPATIENT
Start: 2025-05-08 | End: 2025-05-08

## 2025-05-08 RX ORDER — GABAPENTIN 300 MG/1
600 CAPSULE ORAL ONCE
OUTPATIENT
Start: 2025-05-08 | End: 2025-05-08

## 2025-05-08 RX ORDER — CELECOXIB 400 MG/1
400 CAPSULE ORAL ONCE
OUTPATIENT
Start: 2025-05-08 | End: 2025-05-08

## 2025-05-08 RX ORDER — CEFAZOLIN SODIUM 2 G/100ML
2 INJECTION, SOLUTION INTRAVENOUS ONCE
OUTPATIENT
Start: 2025-05-08 | End: 2025-05-08

## 2025-05-09 DIAGNOSIS — N43.40 SPERMATOCELE: ICD-10-CM

## 2025-07-22 ENCOUNTER — TELEPHONE (OUTPATIENT)
Dept: UROLOGY | Facility: CLINIC | Age: 35
End: 2025-07-22
Payer: COMMERCIAL

## 2025-07-22 NOTE — TELEPHONE ENCOUNTER
Called patient, received verbal confirmation from patient that it is ok to fax FMLA paperwork to listed fax number on FMLA paperwork, also with the paper work including patients dx and tx plan. Per patient ok to fax paperwork.

## 2025-07-31 ENCOUNTER — TELEPHONE (OUTPATIENT)
Dept: UROLOGY | Facility: HOSPITAL | Age: 35
End: 2025-07-31
Payer: COMMERCIAL

## 2025-08-12 ENCOUNTER — TELEMEDICINE (OUTPATIENT)
Dept: UROLOGY | Facility: CLINIC | Age: 35
End: 2025-08-12
Payer: COMMERCIAL

## 2025-08-12 DIAGNOSIS — N43.40 SPERMATOCELE: Primary | ICD-10-CM

## 2025-08-12 DIAGNOSIS — Z30.09 VASECTOMY EVALUATION: ICD-10-CM

## 2025-08-12 PROCEDURE — 99214 OFFICE O/P EST MOD 30 MIN: CPT | Performed by: UROLOGY

## 2025-08-14 ENCOUNTER — APPOINTMENT (OUTPATIENT)
Dept: PREADMISSION TESTING | Facility: HOSPITAL | Age: 35
End: 2025-08-14
Payer: COMMERCIAL

## 2025-08-15 ENCOUNTER — CLINICAL SUPPORT (OUTPATIENT)
Dept: PREADMISSION TESTING | Facility: HOSPITAL | Age: 35
End: 2025-08-15
Payer: COMMERCIAL

## 2025-08-15 RX ORDER — IBUPROFEN 200 MG
400 TABLET ORAL EVERY 8 HOURS PRN
COMMUNITY

## 2025-10-02 ENCOUNTER — APPOINTMENT (OUTPATIENT)
Dept: PRIMARY CARE | Facility: CLINIC | Age: 35
End: 2025-10-02
Payer: COMMERCIAL